# Patient Record
Sex: MALE | Race: WHITE | HISPANIC OR LATINO | Employment: FULL TIME
[De-identification: names, ages, dates, MRNs, and addresses within clinical notes are randomized per-mention and may not be internally consistent; named-entity substitution may affect disease eponyms.]

---
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
## 2024-05-20 ENCOUNTER — HOSPITAL ENCOUNTER (EMERGENCY)
Facility: CLINIC | Age: 24
Discharge: HOME | End: 2024-05-20
Attending: NURSE PRACTITIONER | Admitting: NURSE PRACTITIONER
Payer: COMMERCIAL

## 2024-05-20 VITALS
RESPIRATION RATE: 24 BRPM | DIASTOLIC BLOOD PRESSURE: 72 MMHG | HEART RATE: 79 BPM | TEMPERATURE: 98.5 F | SYSTOLIC BLOOD PRESSURE: 142 MMHG | OXYGEN SATURATION: 98 %

## 2024-05-20 DIAGNOSIS — S61.411A LACERATION OF RIGHT HAND WITHOUT FOREIGN BODY, INITIAL ENCOUNTER: ICD-10-CM

## 2024-05-20 PROCEDURE — 90715 TDAP VACCINE 7 YRS/> IM: CPT

## 2024-05-20 PROCEDURE — 90471 IMMUNIZATION ADMIN: CPT

## 2024-05-20 PROCEDURE — 99203 OFFICE O/P NEW LOW 30 MIN: CPT | Mod: 25

## 2024-05-20 PROCEDURE — 12001 RPR S/N/AX/GEN/TRNK 2.5CM/<: CPT

## 2024-05-20 PROCEDURE — G0463 HOSPITAL OUTPT CLINIC VISIT: HCPCS | Mod: 25

## 2024-05-20 PROCEDURE — 250N000011 HC RX IP 250 OP 636

## 2024-05-20 RX ADMIN — CLOSTRIDIUM TETANI TOXOID ANTIGEN (FORMALDEHYDE INACTIVATED), CORYNEBACTERIUM DIPHTHERIAE TOXOID ANTIGEN (FORMALDEHYDE INACTIVATED), BORDETELLA PERTUSSIS TOXOID ANTIGEN (GLUTARALDEHYDE INACTIVATED), BORDETELLA PERTUSSIS FILAMENTOUS HEMAGGLUTININ ANTIGEN (FORMALDEHYDE INACTIVATED), BORDETELLA PERTUSSIS PERTACTIN ANTIGEN, AND BORDETELLA PERTUSSIS FIMBRIAE 2/3 ANTIGEN 0.5 ML: 5; 2; 2.5; 5; 3; 5 INJECTION, SUSPENSION INTRAMUSCULAR at 15:14

## 2024-05-20 ASSESSMENT — ACTIVITIES OF DAILY LIVING (ADL)
ADLS_ACUITY_SCORE: 33
ADLS_ACUITY_SCORE: 35

## 2024-05-20 ASSESSMENT — COLUMBIA-SUICIDE SEVERITY RATING SCALE - C-SSRS
6. HAVE YOU EVER DONE ANYTHING, STARTED TO DO ANYTHING, OR PREPARED TO DO ANYTHING TO END YOUR LIFE?: NO
1. IN THE PAST MONTH, HAVE YOU WISHED YOU WERE DEAD OR WISHED YOU COULD GO TO SLEEP AND NOT WAKE UP?: NO
2. HAVE YOU ACTUALLY HAD ANY THOUGHTS OF KILLING YOURSELF IN THE PAST MONTH?: NO

## 2024-05-20 NOTE — ED PROVIDER NOTES
History     Chief Complaint   Patient presents with    Laceration     HPI  Reece Ahumada is a 24 year old male who presents for evaluation of a laceration to his right hand that occurred 2 hours ago today.  He was using a pocket knife when he accidentally cut the palmar aspect of his right hand.  He rinsed the area with cold water and then presented here.  Reports localized pain and swelling to the area of injury.  No other injuries.  Denies decreased ROM at the right hand or digits, numbness or tingling, paresthesias, coolness, or pallor. Last tetanus in 2013.    Allergies:  Allergies   Allergen Reactions    No Known Allergies        Problem List:    There are no problems to display for this patient.       Past Medical History:    No past medical history on file.    Past Surgical History:    No past surgical history on file.    Family History:    Family History   Problem Relation Age of Onset    Lipids Maternal Grandmother     Hypertension Maternal Grandfather     Depression Mother        Social History:  Marital Status:  Single [1]  Social History     Tobacco Use    Smoking status: Never        Medications:    No current outpatient medications on file.        Review of Systems  Pertinent review of systems as documented per HPI above.    Physical Exam   BP: (!) 142/72  Pulse: 79  Temp: 98.5  F (36.9  C)  Resp: 24  SpO2: 98 %      Physical Exam  Vitals and nursing note reviewed.   Constitutional:       General: He is not in acute distress.     Appearance: Normal appearance. He is not ill-appearing, toxic-appearing or diaphoretic.   HENT:      Head: Normocephalic and atraumatic.   Cardiovascular:      Rate and Rhythm: Normal rate.   Pulmonary:      Effort: Pulmonary effort is normal.      Breath sounds: Normal breath sounds.   Skin:     General: Skin is warm and dry.      Capillary Refill: Capillary refill takes less than 2 seconds.      Comments: 2cm linear laceration to the palmar aspect of the right hand  overlying the third and fourth MCP   Neurological:      General: No focal deficit present.      Mental Status: He is alert and oriented to person, place, and time.      Sensory: No sensory deficit.   Psychiatric:         Mood and Affect: Mood normal.         Behavior: Behavior normal.         ED [facility] [place]    -Laceration Repair    Date/Time: 5/20/2024 4:10 PM    Performed by: Leonila Hutton PA-C  Authorized by: Leonila Hutton PA-C    Risks, benefits and alternatives discussed.      ANESTHESIA (see MAR for exact dosages):     Anesthesia method:  Local infiltration    Local anesthetic:  Bupivacaine 0.5% WITH epi  LACERATION DETAILS     Location:  Hand (Hand)    Length (cm):  2    REPAIR TYPE:     Repair type:  Simple    EXPLORATION:     Hemostasis achieved with:  Epinephrine and direct pressure    TREATMENT:     Area cleansed with:  Hannah    Amount of cleaning:  Standard    Irrigation solution:  Sterile saline    Irrigation method:  Pressure wash    SKIN REPAIR     Repair method:  Sutures    Suture size:  3-0    Number of sutures:  9    APPROXIMATION     Approximation:  Close    POST-PROCEDURE DETAILS     Dressing:  Antibiotic ointment and adhesive bandage      PROCEDURE    Patient Tolerance:  Patient tolerated the procedure well with no immediate complications        Medications   Tdap (tetanus-diphtheria-acell pertussis) (ADACEL) injection 0.5 mL (0.5 mLs Intramuscular $Given 5/20/24 4347)       Assessments & Plan (with Medical Decision Making)     24-year-old male who presents for evaluation of a laceration to the right hand that occurred today.  See HPI above.  On exam, he is well-appearing with vital signs within normal limits.  There is a 2 cm linear laceration to the palmar aspect of the right hand which is overlying the third and fourth MCP.  No other injuries noted.  The laceration was cleansed and repaired as described in procedure note above and patient  tolerated the procedure well.  Tetanus updated today.  Discussed aftercare instructions including stitch removal in about 7 to 10 days.  Advised him to keep the area clean and dry.  Advised patient to watch for signs of infection including increased pain, swelling, spreading redness, or puslike drainage.  All questions answered.  Patient verbalizes understanding and agreement with the above plan.    I have reviewed the nursing notes.    I have reviewed the findings, diagnosis, plan and need for follow up with the patient.    Final diagnoses:   Laceration of right hand without foreign body, initial encounter       5/20/2024   [facility] EMERGENCY DEPT       Leonila Hutton PA-C  05/21/24 2006

## 2024-05-20 NOTE — DISCHARGE INSTRUCTIONS
You were seen today for a hand laceration.  I closed this with 9 stitches.  You will need to get the stitches removed in 7 to 10 days, you can make a nurse only visit with your primary care or you can come back here for removal.  Watch for signs of infection including increased pain or swelling at the stitches, redness, or puslike drainage.  We also updated your tetanus here.  Keep this dressing on for the next 24 hours, then try to keep the area clean and dry.